# Patient Record
Sex: MALE | Race: OTHER | HISPANIC OR LATINO
[De-identification: names, ages, dates, MRNs, and addresses within clinical notes are randomized per-mention and may not be internally consistent; named-entity substitution may affect disease eponyms.]

---

## 2022-08-30 PROBLEM — Z00.00 ENCOUNTER FOR PREVENTIVE HEALTH EXAMINATION: Status: ACTIVE | Noted: 2022-08-30

## 2022-09-01 ENCOUNTER — APPOINTMENT (OUTPATIENT)
Dept: ORTHOPEDIC SURGERY | Facility: CLINIC | Age: 27
End: 2022-09-01

## 2022-09-01 PROCEDURE — 73060 X-RAY EXAM OF HUMERUS: CPT | Mod: 1L

## 2022-09-01 PROCEDURE — 99072 ADDL SUPL MATRL&STAF TM PHE: CPT | Mod: 1L

## 2022-09-01 PROCEDURE — 99203 OFFICE O/P NEW LOW 30 MIN: CPT | Mod: 1L

## 2022-09-01 PROCEDURE — XXXXX: CPT | Mod: 1L

## 2022-09-02 ENCOUNTER — OUTPATIENT (OUTPATIENT)
Dept: OUTPATIENT SERVICES | Facility: HOSPITAL | Age: 27
LOS: 1 days | End: 2022-09-02
Payer: COMMERCIAL

## 2022-09-02 VITALS
HEART RATE: 95 BPM | SYSTOLIC BLOOD PRESSURE: 132 MMHG | WEIGHT: 278 LBS | RESPIRATION RATE: 16 BRPM | HEIGHT: 66 IN | TEMPERATURE: 98 F | OXYGEN SATURATION: 98 % | DIASTOLIC BLOOD PRESSURE: 86 MMHG

## 2022-09-02 DIAGNOSIS — S42.321D DISPLACED TRANSVERSE FRACTURE OF SHAFT OF HUMERUS, RIGHT ARM, SUBSEQUENT ENCOUNTER FOR FRACTURE WITH ROUTINE HEALING: ICD-10-CM

## 2022-09-02 LAB
ALBUMIN SERPL ELPH-MCNC: 4.7 G/DL — SIGNIFICANT CHANGE UP (ref 3.3–5)
ALP SERPL-CCNC: 171 U/L — HIGH (ref 40–120)
ALT FLD-CCNC: 19 U/L — SIGNIFICANT CHANGE UP (ref 10–45)
ANION GAP SERPL CALC-SCNC: 12 MMOL/L — SIGNIFICANT CHANGE UP (ref 5–17)
AST SERPL-CCNC: 33 U/L — SIGNIFICANT CHANGE UP (ref 10–40)
BILIRUB SERPL-MCNC: 0.2 MG/DL — SIGNIFICANT CHANGE UP (ref 0.2–1.2)
BUN SERPL-MCNC: 10 MG/DL — SIGNIFICANT CHANGE UP (ref 7–23)
CALCIUM SERPL-MCNC: 9.2 MG/DL — SIGNIFICANT CHANGE UP (ref 8.4–10.5)
CHLORIDE SERPL-SCNC: 101 MMOL/L — SIGNIFICANT CHANGE UP (ref 96–108)
CO2 SERPL-SCNC: 25 MMOL/L — SIGNIFICANT CHANGE UP (ref 22–31)
CREAT SERPL-MCNC: 0.55 MG/DL — SIGNIFICANT CHANGE UP (ref 0.5–1.3)
EGFR: 139 ML/MIN/1.73M2 — SIGNIFICANT CHANGE UP
GLUCOSE SERPL-MCNC: 98 MG/DL — SIGNIFICANT CHANGE UP (ref 70–99)
HCT VFR BLD CALC: 41 % — SIGNIFICANT CHANGE UP (ref 39–50)
HGB BLD-MCNC: 13.2 G/DL — SIGNIFICANT CHANGE UP (ref 13–17)
MCHC RBC-ENTMCNC: 24.7 PG — LOW (ref 27–34)
MCHC RBC-ENTMCNC: 32.2 GM/DL — SIGNIFICANT CHANGE UP (ref 32–36)
MCV RBC AUTO: 76.6 FL — LOW (ref 80–100)
NRBC # BLD: 0 /100 WBCS — SIGNIFICANT CHANGE UP (ref 0–0)
PLATELET # BLD AUTO: 358 K/UL — SIGNIFICANT CHANGE UP (ref 150–400)
POTASSIUM SERPL-MCNC: 3.6 MMOL/L — SIGNIFICANT CHANGE UP (ref 3.5–5.3)
POTASSIUM SERPL-SCNC: 3.6 MMOL/L — SIGNIFICANT CHANGE UP (ref 3.5–5.3)
PROT SERPL-MCNC: 8 G/DL — SIGNIFICANT CHANGE UP (ref 6–8.3)
RBC # BLD: 5.35 M/UL — SIGNIFICANT CHANGE UP (ref 4.2–5.8)
RBC # FLD: 13.6 % — SIGNIFICANT CHANGE UP (ref 10.3–14.5)
SODIUM SERPL-SCNC: 138 MMOL/L — SIGNIFICANT CHANGE UP (ref 135–145)
WBC # BLD: 10.11 K/UL — SIGNIFICANT CHANGE UP (ref 3.8–10.5)
WBC # FLD AUTO: 10.11 K/UL — SIGNIFICANT CHANGE UP (ref 3.8–10.5)

## 2022-09-02 RX ORDER — CEFAZOLIN SODIUM 1 G
3000 VIAL (EA) INJECTION ONCE
Refills: 0 | Status: DISCONTINUED | OUTPATIENT
Start: 2022-09-06 | End: 2022-09-20

## 2022-09-02 NOTE — H&P PST ADULT - BP NONINVASIVE SYSTOLIC (MM HG)
Patient scheduled for apt this AM.    ----- Message from Elisabeth Warren MD sent at 3/8/2018  7:57 AM EST -----  Please call and check on patient. His kidney function is down and his white blood cell count is up? Is he doing okay? I will see him tomorrow, but wanted to make sure that he was not sick or having any acute issues.     132

## 2022-09-02 NOTE — H&P PST ADULT - ATTENDING COMMENTS
Associated images, notes, and labs were reviewed. The patient was seen and examined. Risks and benefits of operative versus nonoperative management were discussed with the patient. The patient showed a good understanding of the injury and the treatment options. They would like to move forward with operative management of the right humerus fracture delayed union.
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
## 2022-09-02 NOTE — H&P PST ADULT - FALL HARM RISK - HARM RISK INTERVENTIONS

## 2022-09-02 NOTE — H&P PST ADULT - PROBLEM SELECTOR PLAN 1
Right Humerus ORIF on 9/6/22.  Pre-op education provided - all questions answered. Pt verbalized understanding
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
## 2022-09-03 LAB
HIV 1+2 AB+HIV1 P24 AG SERPL QL IA: SIGNIFICANT CHANGE UP
SARS-COV-2 RNA SPEC QL NAA+PROBE: SIGNIFICANT CHANGE UP

## 2022-09-04 PROBLEM — E66.01 MORBID (SEVERE) OBESITY DUE TO EXCESS CALORIES: Chronic | Status: ACTIVE | Noted: 2022-09-02

## 2022-09-05 ENCOUNTER — TRANSCRIPTION ENCOUNTER (OUTPATIENT)
Age: 27
End: 2022-09-05

## 2022-09-06 ENCOUNTER — TRANSCRIPTION ENCOUNTER (OUTPATIENT)
Age: 27
End: 2022-09-06

## 2022-09-06 ENCOUNTER — OUTPATIENT (OUTPATIENT)
Dept: INPATIENT UNIT | Facility: HOSPITAL | Age: 27
LOS: 1 days | Discharge: HOME | End: 2022-09-06
Payer: COMMERCIAL

## 2022-09-06 ENCOUNTER — APPOINTMENT (OUTPATIENT)
Dept: ORTHOPEDIC SURGERY | Facility: HOSPITAL | Age: 27
End: 2022-09-06

## 2022-09-06 VITALS
TEMPERATURE: 98 F | OXYGEN SATURATION: 100 % | SYSTOLIC BLOOD PRESSURE: 127 MMHG | HEART RATE: 81 BPM | RESPIRATION RATE: 18 BRPM | DIASTOLIC BLOOD PRESSURE: 88 MMHG | HEIGHT: 66 IN | WEIGHT: 278 LBS

## 2022-09-06 VITALS
RESPIRATION RATE: 18 BRPM | HEART RATE: 84 BPM | OXYGEN SATURATION: 95 % | TEMPERATURE: 98 F | SYSTOLIC BLOOD PRESSURE: 130 MMHG | DIASTOLIC BLOOD PRESSURE: 85 MMHG

## 2022-09-06 DIAGNOSIS — S42.321D DISPLACED TRANSVERSE FRACTURE OF SHAFT OF HUMERUS, RIGHT ARM, SUBSEQUENT ENCOUNTER FOR FRACTURE WITH ROUTINE HEALING: ICD-10-CM

## 2022-09-06 LAB
BLD GP AB SCN SERPL QL: NEGATIVE — SIGNIFICANT CHANGE UP
RH IG SCN BLD-IMP: POSITIVE — SIGNIFICANT CHANGE UP

## 2022-09-06 PROCEDURE — 85027 COMPLETE CBC AUTOMATED: CPT

## 2022-09-06 PROCEDURE — 87389 HIV-1 AG W/HIV-1&-2 AB AG IA: CPT

## 2022-09-06 PROCEDURE — 36415 COLL VENOUS BLD VENIPUNCTURE: CPT

## 2022-09-06 PROCEDURE — U0005: CPT

## 2022-09-06 PROCEDURE — U0003: CPT

## 2022-09-06 PROCEDURE — 80053 COMPREHEN METABOLIC PANEL: CPT

## 2022-09-06 PROCEDURE — 24515 OPTX HUMRL SHFT FX PLATE/SCR: CPT | Mod: RT

## 2022-09-06 PROCEDURE — C9399: CPT

## 2022-09-06 PROCEDURE — C1713: CPT

## 2022-09-06 PROCEDURE — 86900 BLOOD TYPING SEROLOGIC ABO: CPT

## 2022-09-06 PROCEDURE — 76000 FLUOROSCOPY <1 HR PHYS/QHP: CPT

## 2022-09-06 PROCEDURE — 86901 BLOOD TYPING SEROLOGIC RH(D): CPT

## 2022-09-06 PROCEDURE — G0463: CPT

## 2022-09-06 PROCEDURE — C1889: CPT

## 2022-09-06 PROCEDURE — 86850 RBC ANTIBODY SCREEN: CPT

## 2022-09-06 PROCEDURE — 24430 RPR NON/MAL HUMERUS WO GRAFT: CPT | Mod: RT

## 2022-09-06 DEVICE — SCREW CORT S-T 4.5X32MM: Type: IMPLANTABLE DEVICE | Site: RIGHT | Status: FUNCTIONAL

## 2022-09-06 DEVICE — IMPLANTABLE DEVICE: Type: IMPLANTABLE DEVICE | Site: RIGHT | Status: FUNCTIONAL

## 2022-09-06 DEVICE — SCR CORT S-T 4.5X30MM: Type: IMPLANTABLE DEVICE | Site: RIGHT | Status: FUNCTIONAL

## 2022-09-06 DEVICE — SCREW CORT S-T 4.5X34MM: Type: IMPLANTABLE DEVICE | Site: RIGHT | Status: FUNCTIONAL

## 2022-09-06 DEVICE — SCREW CORT S-T 4.5X28MM: Type: IMPLANTABLE DEVICE | Site: RIGHT | Status: FUNCTIONAL

## 2022-09-06 RX ORDER — LIDOCAINE HCL 20 MG/ML
0.2 VIAL (ML) INJECTION ONCE
Refills: 0 | Status: DISCONTINUED | OUTPATIENT
Start: 2022-09-06 | End: 2022-09-06

## 2022-09-06 RX ORDER — ONDANSETRON 8 MG/1
1 TABLET, FILM COATED ORAL
Qty: 30 | Refills: 0
Start: 2022-09-06 | End: 2022-09-15

## 2022-09-06 RX ORDER — OXYCODONE HYDROCHLORIDE 5 MG/1
5 TABLET ORAL ONCE
Refills: 0 | Status: DISCONTINUED | OUTPATIENT
Start: 2022-09-06 | End: 2022-09-06

## 2022-09-06 RX ORDER — OXYCODONE HYDROCHLORIDE 5 MG/1
1 TABLET ORAL
Qty: 15 | Refills: 0
Start: 2022-09-06

## 2022-09-06 RX ORDER — HYDROMORPHONE HYDROCHLORIDE 2 MG/ML
0.5 INJECTION INTRAMUSCULAR; INTRAVENOUS; SUBCUTANEOUS
Refills: 0 | Status: DISCONTINUED | OUTPATIENT
Start: 2022-09-06 | End: 2022-09-06

## 2022-09-06 RX ORDER — OXYCODONE HYDROCHLORIDE 5 MG/1
1 TABLET ORAL
Qty: 0 | Refills: 0 | DISCHARGE

## 2022-09-06 RX ORDER — SODIUM CHLORIDE 9 MG/ML
3 INJECTION INTRAMUSCULAR; INTRAVENOUS; SUBCUTANEOUS EVERY 8 HOURS
Refills: 0 | Status: DISCONTINUED | OUTPATIENT
Start: 2022-09-06 | End: 2022-09-06

## 2022-09-06 RX ORDER — CHLORHEXIDINE GLUCONATE 213 G/1000ML
1 SOLUTION TOPICAL ONCE
Refills: 0 | Status: DISCONTINUED | OUTPATIENT
Start: 2022-09-06 | End: 2022-09-06

## 2022-09-06 RX ORDER — ONDANSETRON 8 MG/1
4 TABLET, FILM COATED ORAL ONCE
Refills: 0 | Status: DISCONTINUED | OUTPATIENT
Start: 2022-09-06 | End: 2022-09-06

## 2022-09-06 RX ORDER — DOCUSATE SODIUM 100 MG
1 CAPSULE ORAL
Qty: 21 | Refills: 0
Start: 2022-09-06 | End: 2022-09-12

## 2022-09-06 RX ADMIN — OXYCODONE HYDROCHLORIDE 5 MILLIGRAM(S): 5 TABLET ORAL at 15:25

## 2022-09-06 RX ADMIN — OXYCODONE HYDROCHLORIDE 5 MILLIGRAM(S): 5 TABLET ORAL at 16:07

## 2022-09-06 RX ADMIN — HYDROMORPHONE HYDROCHLORIDE 0.5 MILLIGRAM(S): 2 INJECTION INTRAMUSCULAR; INTRAVENOUS; SUBCUTANEOUS at 14:11

## 2022-09-06 RX ADMIN — OXYCODONE HYDROCHLORIDE 5 MILLIGRAM(S): 5 TABLET ORAL at 14:55

## 2022-09-06 RX ADMIN — HYDROMORPHONE HYDROCHLORIDE 0.5 MILLIGRAM(S): 2 INJECTION INTRAMUSCULAR; INTRAVENOUS; SUBCUTANEOUS at 13:50

## 2022-09-06 RX ADMIN — HYDROMORPHONE HYDROCHLORIDE 0.5 MILLIGRAM(S): 2 INJECTION INTRAMUSCULAR; INTRAVENOUS; SUBCUTANEOUS at 13:56

## 2022-09-06 RX ADMIN — HYDROMORPHONE HYDROCHLORIDE 0.5 MILLIGRAM(S): 2 INJECTION INTRAMUSCULAR; INTRAVENOUS; SUBCUTANEOUS at 14:26

## 2022-09-06 RX ADMIN — OXYCODONE HYDROCHLORIDE 5 MILLIGRAM(S): 5 TABLET ORAL at 16:32

## 2022-09-06 NOTE — ASU DISCHARGE PLAN (ADULT/PEDIATRIC) - NS MD DC FALL RISK RISK
For information on Fall & Injury Prevention, visit: [email or web address] OR  [email or web address] OR  https://www.cdc.gov/steadi/patient.html

## 2022-09-06 NOTE — ASU DISCHARGE PLAN (ADULT/PEDIATRIC) - ASU DC SPECIAL INSTRUCTIONSFT
Keep arm in sling; no lifting with right arm    call office for follow up appointment Keep arm in sling until post operative day#1  no lifting with right arm  weight bearing as tolerated to right upper extremity  May Shower on post operative day#2 (9/9/21), Keep dressing clean and dry, use waterproof bag and seal to cover dressing.    call office for follow up appointment Keep arm in sling until post operative day#1  no lifting with right arm  weight bearing as tolerated to right upper extremity  May Shower on post operative day#2 (9/8/21), Keep dressing clean and dry, use waterproof bag and seal to cover dressing.    call office for follow up appointment

## 2022-09-06 NOTE — BRIEF OPERATIVE NOTE - NSICDXBRIEFPROCEDURE_GEN_ALL_CORE_FT
PROCEDURES:  ORIF, fracture, humerus, shaft, using plate and screws 06-Sep-2022 13:15:05  Arturo Higginbotham

## 2022-09-06 NOTE — ASU DISCHARGE PLAN (ADULT/PEDIATRIC) - CARE PROVIDER_API CALL
Davon Ramos)  Orthopedics  [address] [place] [address]  Phone: [phone]  Fax: [phone]  Follow Up Time:

## 2022-09-21 ENCOUNTER — APPOINTMENT (OUTPATIENT)
Dept: ORTHOPEDIC SURGERY | Facility: CLINIC | Age: 27
End: 2022-09-21

## 2022-09-21 ENCOUNTER — NON-APPOINTMENT (OUTPATIENT)
Age: 27
End: 2022-09-21

## 2022-09-21 VITALS — HEART RATE: 89 BPM | SYSTOLIC BLOOD PRESSURE: 112 MMHG | DIASTOLIC BLOOD PRESSURE: 87 MMHG

## 2022-09-21 PROBLEM — S42.321D: Chronic | Status: ACTIVE | Noted: 2022-09-02

## 2022-09-21 PROCEDURE — 99024 POSTOP FOLLOW-UP VISIT: CPT

## 2022-10-26 NOTE — DISCUSSION/SUMMARY
[de-identified] : 27-year-old gentleman with right humeral shaft fracture, slightly over 2 months out.\par -The risks and benefits of operative versus nonoperative management were discussed at length with the patient. The patient shows a good understanding of the injury and treatment options. They would like to move forward with operative management. \par -X-rays show that the fracture is healing but it is not completely healed.  I explained that if we give it more time it would likely heal.  The patient showed a good understanding but he still wanted to move forward with conversion to operative management\par -Nonweightbearing right upper extremity\par -We will book for open reduction internal fixation right humeral shaft\par -Shoulder range of motion exercises.  These were demonstrated in the office today\par -Follow-up 2 weeks after surgery\par -All of the patient's questions and concerns were addressed.\par

## 2022-10-26 NOTE — PHYSICAL EXAM
[de-identified] : Mr. DWAYNE WARNER is sitting comfortably in the exam room \par Right arm\par -Skin is intact, no swelling, no ecchymosis\par -Palpable deformity at fracture site mid shaft\par -FE:160 , ER:neutral , IR: L% , ABD: 90\par -Able to make a fist\par -Able to extend wrist and fingers\par -Sensation is intact median, radial, ulnar, axillary nerves\par -Motor is intact median, radial, ulnar, axillary nerves\par -Hand is warm and well-perfused, Palpable radial and ulnar pulses [de-identified] : X-rays of the right humerus show a midshaft fracture with minimal callus formation.  Slight varus alignment.  There is some pseudosubluxation inferiorly of the humeral head relative to the glenoid.
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
## 2022-10-26 NOTE — DISCUSSION/SUMMARY
[de-identified] : 27-year-old gentleman with right humerus fracture, approximately 3 months out.\par \par \par -Begin Physical therapy\par -Weightbearing as tolerated\par -Follow-up in 1 month with x-rays at that time\par -All the patient's questions and concerns were addressed during this visit\par

## 2022-10-26 NOTE — DISCUSSION/SUMMARY
[de-identified] : 27-year-old gentleman with right humerus fracture, approximately 3 months out.\par \par \par -Begin Physical therapy\par -Weightbearing as tolerated\par -Follow-up in 1 month with x-rays at that time\par -All the patient's questions and concerns were addressed during this visit\par

## 2022-10-27 ENCOUNTER — APPOINTMENT (OUTPATIENT)
Dept: MRI IMAGING | Facility: IMAGING CENTER | Age: 27
End: 2022-10-27

## 2022-10-27 ENCOUNTER — OUTPATIENT (OUTPATIENT)
Dept: OUTPATIENT SERVICES | Facility: HOSPITAL | Age: 27
LOS: 1 days | End: 2022-10-27
Payer: COMMERCIAL

## 2022-10-27 ENCOUNTER — APPOINTMENT (OUTPATIENT)
Dept: ORTHOPEDIC SURGERY | Facility: CLINIC | Age: 27
End: 2022-10-27

## 2022-10-27 DIAGNOSIS — S42.321D DISPLACED TRANSVERSE FRACTURE OF SHAFT OF HUMERUS, RIGHT ARM, SUBSEQUENT ENCOUNTER FOR FRACTURE WITH ROUTINE HEALING: ICD-10-CM

## 2022-10-27 PROCEDURE — 73030 X-RAY EXAM OF SHOULDER: CPT | Mod: RT

## 2022-10-27 PROCEDURE — 73221 MRI JOINT UPR EXTREM W/O DYE: CPT | Mod: 26,RT

## 2022-10-27 PROCEDURE — 73060 X-RAY EXAM OF HUMERUS: CPT | Mod: RT

## 2022-10-27 PROCEDURE — 73221 MRI JOINT UPR EXTREM W/O DYE: CPT

## 2022-10-27 PROCEDURE — 99024 POSTOP FOLLOW-UP VISIT: CPT
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
## 2023-01-11 ENCOUNTER — APPOINTMENT (OUTPATIENT)
Dept: ORTHOPEDIC SURGERY | Facility: CLINIC | Age: 28
End: 2023-01-11
Payer: COMMERCIAL

## 2023-01-11 PROCEDURE — 99213 OFFICE O/P EST LOW 20 MIN: CPT

## 2023-01-11 PROCEDURE — 99072 ADDL SUPL MATRL&STAF TM PHE: CPT

## 2023-01-11 PROCEDURE — 73060 X-RAY EXAM OF HUMERUS: CPT
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
## 2023-01-11 NOTE — PHYSICAL EXAM
[de-identified] : Mr. DWAYNE WARNER is sitting comfortably in the exam room \par Right shoulder\par -Skin is intact, no swelling, no ecchymosis\par -Incision is well-healed, no erythema, no signs of infection\par -Passive FE: 180, ER: 45, IR: L1 90, ABD: 90\par -Able to make a fist\par -Equivocal apprehension test.  The patient is guarding\par -Unable to perform load-and-shift test due to guarding\par -Equivocal drop arm, unable to keep the arm extended straight in front of him at 90 degrees of extension\par -Sensation is intact median, radial, ulnar, axillary nerves, slight decrease in sensation along the lateral aspect of the elbow and forearm\par -Motor is intact median, radial, ulnar, axillary nerves\par -Hand is warm and well-perfused, Palpable radial and ulnar pulses\par  [de-identified] : Xrays of the right humerus were taken in the office today, 1/11/23.  AP and lateral.  X-rays show good overall alignment of the humerus with good positioning of the plate and screws.  There is interval callus at the fracture site.  No lucencies around the screws.\par \par \par EXAM: [id number] - MR SHOULDER RT - ORDERED BY: SHI GUILLEN\par \par \par PROCEDURE DATE: 10/27/2022\par \par \par \par INTERPRETATION: EXAMINATION: MR SHOULDER RIGHT\par \par CLINICAL INDICATION: Right shoulder pain. History of right humerus shaft fracture status post repair. Assess rotator cuff;\par \par COMPARISON: Fluoroscopic images dated 9/6/2022. OrthoPACS radiographs dated 10/27/2022.\par \par TECHNIQUE: Multiplanar, multi-sequence MRI of the right shoulder was performed without intravenous contrast.\par \par INTERPRETATION:\par \par LOCALIZER: Right humeral diaphysis surgical hardware with susceptibility artifact noted.\par \par Glenohumeral joint: There is no fracture. There is no focal cartilage defect. There is no effusion or synovial thickening.\par \par Acromioclavicular joint: There is an os acromiale, with marrow edema.\par \par Rotator cuff and bursae: There is very low grade focal interstitial tearing of the supraspinatus anterior fibers (series 8 image 19). More posteriorly there is low-grade partial-thickness tearing of the articular surface supraspinatus insertional fibers (series 8 image 22). The findings are superimposed on mild tendinosis. The infraspinatus, teres minor, and subscapularis tendons are intact. There is mild diffuse fatty infiltration of the rotator cuff muscles, without asymmetric atrophy. There is trace subacromial/subdeltoid bursal fluid.\par \par Biceps tendon and glenoid labrum: There is a mild fluid within the extra-articular long head of biceps tendon sheath, within the groove. The labrum is intact.\par \par IMPRESSION:\par 1. Low grade partial-thickness interstitial and articular surface tears of the supraspinatus tendon, findings superimposed on mild tendinosis. No high-grade or retracted tendon tear.\par \par 2. Moderate tenosynovitis of biceps tendon long head.\par \par 3. Os acromiale with internal marrow edema, suggesting os acromiale syndrome, in the appropriate clinical setting.\par \par 4. Trace subacromial/subdeltoid bursal fluid.\par \par --- End of Report ---\par \par \par \par \par \par FLOR MENDOZA MD; Resident Radiologist\par This document has been electronically signed.\par SHLOMIT A GOLDBERG-STEIN MD; Attending Radiologist

## 2023-01-11 NOTE — DISCUSSION/SUMMARY
[de-identified] : 27-year-old gentleman with right humeral shaft fracture, approximately 4 months \par -The x-ray findings and physical exam findings were discussed with the patient.  The patient continues to have some numbness on the lateral aspect of his arm.  He reports that the numbness on the dorsal aspect of his hand is new and started in the last few weeks.  He has no weakness with extension of the wrist or extension of the fingers.  I explained that there could be some irritation of the radial nerve given the extensive callus that is forming around the fracture site.\par -Continue Physical therapy: ROM and strengthening\par -Weightbearing as tolerated\par -Follow-up 3 months with x-rays of the right humerus at that time\par -All the patient's and patient's mother's questions and concerns were addressed during this visit\par \par

## 2023-01-25 ENCOUNTER — TRANSCRIPTION ENCOUNTER (OUTPATIENT)
Age: 28
End: 2023-01-25

## 2023-04-13 ENCOUNTER — APPOINTMENT (OUTPATIENT)
Dept: ORTHOPEDIC SURGERY | Facility: CLINIC | Age: 28
End: 2023-04-13
Payer: MEDICAID

## 2023-04-13 PROCEDURE — 99213 OFFICE O/P EST LOW 20 MIN: CPT

## 2023-04-13 PROCEDURE — 73060 X-RAY EXAM OF HUMERUS: CPT

## 2023-04-13 RX ORDER — GABAPENTIN 100 MG/1
100 CAPSULE ORAL 3 TIMES DAILY
Qty: 90 | Refills: 0 | Status: ACTIVE | COMMUNITY
Start: 2023-04-13 | End: 1900-01-01

## 2023-04-13 NOTE — DISCUSSION/SUMMARY
[de-identified] : 28-year-old gentleman with right humeral shaft fracture, approximately 7 months out, doing great\par -Zickel exam and x-ray findings were discussed with the patient.  He feels he is significantly improved since the last visit.  He is very happy with his care.  His primary complaint is some numbness on the lateral aspect of the arm.\par -Continue Physical therapy: ROM and strengthening\par -Weightbearing as tolerated\par -Gabapentin for pain\par -Follow-up in 6 months with x-rays of the right humerus at that time\par -All the patient's and patient's mother's questions and concerns were addressed during this visit\par \par

## 2023-04-13 NOTE — PHYSICAL EXAM
[de-identified] : Mr. DWAYNE WARNER is sitting comfortably in the exam room \par Right shoulder\par -Skin is intact, no swelling, no ecchymosis\par -Incision is well-healed, no erythema, no signs of infection\par -Passive FE: 180, ER: 45, IR: L1 90, ABD: 90\par -Able to make a fist\par -Sensation is intact median, radial, ulnar, axillary nerves, slight decrease in sensation along the lateral aspect of the elbow and forearm\par -Motor is intact median, radial, ulnar, axillary nerves\par -Hand is warm and well-perfused, Palpable radial and ulnar pulses\par  [de-identified] : Xrays of the right humerus were taken in the office today, 4/13/23.  AP and lateral.  X-rays show good overall alignment of the humerus with good positioning of the plate and screws.  There is interval callus at the fracture site.  No lucencies around the screws.\par
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
## 2023-10-18 ENCOUNTER — APPOINTMENT (OUTPATIENT)
Dept: ORTHOPEDIC SURGERY | Facility: CLINIC | Age: 28
End: 2023-10-18
Payer: MEDICAID

## 2023-10-18 VITALS — BODY MASS INDEX: 44.68 KG/M2 | WEIGHT: 278 LBS | HEIGHT: 66 IN

## 2023-10-18 DIAGNOSIS — S42.321D DISPLACED TRANSVERSE FRACTURE OF SHAFT OF HUMERUS, RIGHT ARM, SUBSEQUENT ENCOUNTER FOR FRACTURE WITH ROUTINE HEALING: ICD-10-CM

## 2023-10-18 PROCEDURE — 99213 OFFICE O/P EST LOW 20 MIN: CPT | Mod: 25

## 2023-10-18 PROCEDURE — 73060 X-RAY EXAM OF HUMERUS: CPT

## 2023-10-18 RX ORDER — GABAPENTIN 100 MG/1
100 CAPSULE ORAL TWICE DAILY
Qty: 60 | Refills: 0 | Status: ACTIVE | COMMUNITY
Start: 2023-10-18 | End: 1900-01-01

## (undated) DEVICE — GOWN TRIMAX LG

## (undated) DEVICE — GLV 7.5 PROTEXIS (WHITE)

## (undated) DEVICE — DRILL BIT SYNTHES ORTHO QC 3.2X145MM

## (undated) DEVICE — WARMING BLANKET LOWER ADULT

## (undated) DEVICE — DRSG STOCKINETTE IMPERVIOUS MED

## (undated) DEVICE — GLV 6.5 PROTEXIS (WHITE)

## (undated) DEVICE — GLV 9 DURAPRENE

## (undated) DEVICE — VENODYNE/SCD SLEEVE CALF LARGE

## (undated) DEVICE — SUT VICRYL 2-0 36" CT UNDYED

## (undated) DEVICE — SOL IRR POUR NS 0.9% 500ML

## (undated) DEVICE — Device

## (undated) DEVICE — FOLEY TRAY 16FR 5CC LTX UMETER CLOSED

## (undated) DEVICE — SLING ARM CHIEFTAIN MESH XL

## (undated) DEVICE — VISITEC 4X4

## (undated) DEVICE — GLV 8.5 PROTEXIS (WHITE)

## (undated) DEVICE — SPECIMEN CONTAINER 100ML

## (undated) DEVICE — DRAPE INSTRUMENT POUCH 6.75" X 11"

## (undated) DEVICE — MARKING PEN W RULER

## (undated) DEVICE — DRSG AQUACEL 3.5 X 10"

## (undated) DEVICE — GLV 7 PROTEXIS (WHITE)

## (undated) DEVICE — PACK EXTREMITY

## (undated) DEVICE — SLING SHOULDER IMMOBILIZER CLINIC LARGE

## (undated) DEVICE — DRAPE TOWEL BLUE 17" X 24"

## (undated) DEVICE — DRAPE MAYO STAND 30"

## (undated) DEVICE — DRAPE C ARM UNIVERSAL

## (undated) DEVICE — DRAPE IOBAN 23" X 23"

## (undated) DEVICE — POSITIONER FOAM EGG CRATE ULNAR 2PCS (PINK)

## (undated) DEVICE — STAPLER SKIN VISI-STAT 35 WIDE

## (undated) DEVICE — GLV 8 PROTEXIS (WHITE)

## (undated) DEVICE — DRSG DERMABOND 0.7ML

## (undated) DEVICE — SOL IRR POUR H2O 250ML

## (undated) DEVICE — BLADE SCALPEL SAFETYLOCK #15

## (undated) DEVICE — MEDICATION LABELS W MARKER

## (undated) DEVICE — LAP PAD 18 X 18"

## (undated) DEVICE — DRAPE SPLIT SHEET 77" X 108"

## (undated) DEVICE — DRSG STERISTRIPS 0.5 X 4"